# Patient Record
Sex: MALE | Race: WHITE
[De-identification: names, ages, dates, MRNs, and addresses within clinical notes are randomized per-mention and may not be internally consistent; named-entity substitution may affect disease eponyms.]

---

## 2020-09-06 ENCOUNTER — HOSPITAL ENCOUNTER (EMERGENCY)
Dept: HOSPITAL 11 - JP.ED | Age: 70
Discharge: HOME | End: 2020-09-06
Payer: MEDICARE

## 2020-09-06 DIAGNOSIS — S61.243A: Primary | ICD-10-CM

## 2020-09-06 DIAGNOSIS — Z79.899: ICD-10-CM

## 2020-09-06 DIAGNOSIS — I10: ICD-10-CM

## 2020-09-06 DIAGNOSIS — Z79.82: ICD-10-CM
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
## 2020-09-06 DIAGNOSIS — E66.9: ICD-10-CM

## 2020-09-06 NOTE — EDM.PDOC
ED HPI GENERAL MEDICAL PROBLEM





- General


Chief Complaint: General


Stated Complaint: FISH HOOK IN MIDDLE FINGER


Time Seen by Provider: 09/06/20 09:58


Source of Information: Reports: Patient


History Limitations: Reports: No Limitations





- History of Present Illness


INITIAL COMMENTS - FREE TEXT/NARRATIVE: 





70-year-old male presents to this emergency department for evaluation and paula

atment of a fishhook stuck in the nondominant hand left middle finger.  Incident

occurred just prior to arrival.  Patient states that he attempted to push the 

fishhook through but was unable to do so.  Patient reports pain at site of 

injury.  Denies any weakness or loss of sensation.  Denies any other associated 

symptoms.


Onset: Today


Onset Date: 09/06/20


Onset Time: 09:00


Duration: Constant


Location: Reports: Upper Extremity, Left (Left middle finger)


Quality: Reports: Sharp


Severity: Mild


Improves with: Reports: None


Worsens with: Reports: None


Context: Reports: Trauma


Associated Symptoms: Reports: No Other Symptoms





- Related Data


                                    Allergies











Allergy/AdvReac Type Severity Reaction Status Date / Time


 


No Known Allergies Allergy   Verified 09/06/20 09:56











Home Meds: 


                                    Home Meds





Aspirin [Halfprin] 81 mg PO DAILY 09/06/20 [History]


Omeprazole 20 mg PO DAILY 09/06/20 [History]


atorvaSTATin [Lipitor] 40 mg PO BEDTIME 09/06/20 [History]


lisinopriL [Prinivil] 20 mg PO DAILY 09/06/20 [History]











Past Medical History


HEENT History: Reports: Impaired Vision


Cardiovascular History: Reports: High Cholesterol, Hypertension


Respiratory History: Reports: Sleep Apnea


Other Respiratory History: cpap


Gastrointestinal History: Reports: None


Musculoskeletal History: Reports: None


Endocrine/Metabolic History: Reports: Obesity/BMI 30+





- Past Surgical History


Head Surgeries/Procedures: Reports: None


HEENT Surgical History: Reports: Adenoidectomy, Tonsillectomy


Cardiovascular Surgical History: Reports: None


Respiratory Surgical History: Reports: None


GI Surgical History: Reports: Cholecystectomy


Endocrine Surgical History: Reports: None


Musculoskeletal Surgical History: Reports: Other (See Below)


Dermatological Surgical History: Reports: None





Social & Family History





- Tobacco Use


Smoking Status *Q: Never Smoker


Second Hand Smoke Exposure: No





- Caffeine Use


Caffeine Use: Reports: Coffee





- Alcohol Use


Days Per Week of Alcohol Use: 5


Number of Drinks Per Day: 3


Total Drinks Per Week: 15





- Recreational Drug Use


Recreational Drug Use: No





ED ROS GENERAL





- Review of Systems


Review Of Systems: Comprehensive ROS is negative, except as noted in HPI.





ED EXAM, GENERAL





- Physical Exam


Exam: See Below


Exam Limited By: No Limitations


General Appearance: Alert


Respiratory/Chest: No Respiratory Distress


Cardiovascular: Normal Peripheral Pulses


Extremities: Other (There is a single fishhook embedded in the dorsal aspect of 

the left middle finger located between the DIP and PIP joint.)


Neurological: No Motor/Sensory Deficits


Skin Exam: Other (Puncture wound of left middle finger with foreign body 

(fishhook))





ED GENERAL MEDICAL PROCEDURES





- Additional/Other Procedure(s)


Other (Free Text) Procedure(s): 





Procedure:


[place] removal left middle finger


Single fishhook embedded in dorsal aspect of left middle finger located between 

the PIP and DIP joint.  0.5 cc of 1% plain lidocaine was injected using a 25-

gauge needle along the strength of the fishhook underneath the skin.  Once good 

local anesthesia was obtained fishhook was then advanced forward until miles was 

released.  Remaining fishhook which had been previously cut midshaft by patient 

was advanced through the new opening and completely removed.  No retaining 

foreign body.  There was good blood flow following removal of the foreign body. 

 Wounds were thoroughly irrigated and cleaned.  Wound is bandaged with 

compressive dressing.  Patient tolerated procedure well and was improved 

following removal of fishhook.





Course





- Vital Signs


Last Recorded V/S: 


                                Last Vital Signs











Temp  98.3 F   09/06/20 09:59


 


Pulse  64   09/06/20 09:59


 


Resp  16   09/06/20 09:59


 


BP  168/76 H  09/06/20 09:59


 


Pulse Ox  98   09/06/20 09:59














Departure





- Departure


Time of Disposition: 10:30


Disposition: Home, Self-Care 01


Condition: Good


Clinical Impression: 


Fish hook injury of finger of left hand


Qualifiers:


 Encounter type: initial encounter Qualified Code(s): S69.92XA - Unspecified 

injury of left wrist, hand and finger(s), initial encounter








- Discharge Information


Instructions:  Puncture Wound, Easy-to-Read


Referrals: 


PCP,None [Primary Care Provider] - 


Forms:  ED Department Discharge


Additional Instructions: 


1.  Keep the injured finger clean and dry.


2.  Apply an antibacterial ointment twice daily during wound healing.


3.  Seek urgent medical evaluation with any signs or symptoms of infection 

including but not limited to fever, increased redness, pain, swelling and drain

age from the injury site.


4.  Use over-the-counter medication such as Tylenol or ibuprofen as directed on 

the bottle for pain.





Sepsis Event Note (ED)





- Evaluation


Sepsis Screening Result: No Definite Risk





- Focused Exam


Vital Signs: 


                                   Vital Signs











  Temp Pulse Resp BP Pulse Ox


 


 09/06/20 09:59  98.3 F  64  16  168/76 H  98


 


 09/06/20 09:58  98.3 F  64  16  168/76 H  98














- Assessment/Plan


Assessment:: 





70-year-old male with foreign body in nondominant hand, left middle finger.  

Foreign body is a fishhook.  The fishhook was removed without difficulty here in

the emergency department.  Patient's tetanus status reviewed.  Patient elected 

to have tetanus updated at


[facility] care visit.  Patient counseled on wound management and requirement for 

follow-up evaluation with any signs or symptoms of developing infection.  No 

evidence of motor or sensory impairment on examination once foreign body was 

removed.


Plan: 





Follow-up as needed.